# Patient Record
Sex: FEMALE | Race: WHITE
[De-identification: names, ages, dates, MRNs, and addresses within clinical notes are randomized per-mention and may not be internally consistent; named-entity substitution may affect disease eponyms.]

---

## 2022-02-10 PROBLEM — Z00.00 ENCOUNTER FOR PREVENTIVE HEALTH EXAMINATION: Status: ACTIVE | Noted: 2022-02-10

## 2022-02-14 ENCOUNTER — APPOINTMENT (OUTPATIENT)
Dept: UROGYNECOLOGY | Facility: CLINIC | Age: 67
End: 2022-02-14
Payer: MEDICARE

## 2022-02-14 DIAGNOSIS — R35.1 NOCTURIA: ICD-10-CM

## 2022-02-14 DIAGNOSIS — Z81.8 FAMILY HISTORY OF OTHER MENTAL AND BEHAVIORAL DISORDERS: ICD-10-CM

## 2022-02-14 DIAGNOSIS — E11.9 TYPE 2 DIABETES MELLITUS W/OUT COMPLICATIONS: ICD-10-CM

## 2022-02-14 DIAGNOSIS — Z83.3 FAMILY HISTORY OF DIABETES MELLITUS: ICD-10-CM

## 2022-02-14 LAB
BILIRUB UR QL STRIP: NORMAL
CLARITY UR: NORMAL
COLLECTION METHOD: NORMAL
GLUCOSE UR-MCNC: NORMAL
HCG UR QL: 0.2 EU/DL
HGB UR QL STRIP.AUTO: NORMAL
KETONES UR-MCNC: NORMAL
LEUKOCYTE ESTERASE UR QL STRIP: NORMAL
NITRITE UR QL STRIP: NORMAL
PH UR STRIP: 5.5
PROT UR STRIP-MCNC: NORMAL
SP GR UR STRIP: 1.03

## 2022-02-14 PROCEDURE — 81003 URINALYSIS AUTO W/O SCOPE: CPT | Mod: QW

## 2022-02-14 PROCEDURE — 99203 OFFICE O/P NEW LOW 30 MIN: CPT

## 2022-02-14 NOTE — REASON FOR VISIT
[Initial Visit ___] : an initial visit for [unfilled] [Other: _____] : [unfilled] [Questionnaire Received] : Patient questionnaire received [Intake Form Reviewed] : Patient intake form with past medical history, surgical history, family history and social history reviewed today

## 2022-02-15 PROBLEM — E11.9 DIABETES MELLITUS: Status: ACTIVE | Noted: 2022-02-15

## 2022-02-15 PROBLEM — Z81.8 FAMILY HISTORY OF DEPRESSION: Status: ACTIVE | Noted: 2022-02-15

## 2022-02-15 PROBLEM — Z83.3 FAMILY HISTORY OF DIABETES MELLITUS: Status: ACTIVE | Noted: 2022-02-15

## 2022-02-16 NOTE — PHYSICAL EXAM
[Chaperone Present] : A chaperone was present in the examining room during all aspects of the physical examination [No Acute Distress] : in no acute distress [Well developed] : well developed [Well Nourished] : ~L well nourished [Normal Mood/Affect] : mood and affect are normal [Respirations regular] : ~T respiratory rate was regular [No Edema] : ~T edema was not present [Warm and Dry] : was warm and dry to touch [Vulvar Atrophy] : vulvar atrophy [Normal Appearance] : general appearance was normal [Uterine Adnexae] : were not tender and not enlarged [Normal] : normal [Post Void Residual ____ml] : post void residual was [unfilled] ml [Anxiety] : patient is not anxious [Tenderness] : ~T no ~M abdominal tenderness observed [Distended] : not distended

## 2022-02-16 NOTE — HISTORY OF PRESENT ILLNESS
[Vaginal Wall Prolapse] : no [Rectal Prolapse] : no [] : years ago [Urinary Frequency] : no [Feelings Of Urinary Urgency] : no [Urinary Tract Infection] : no [x3+] : nocturia three or more  times a night [de-identified] : sometimes  [FreeTextEntry1] : pt was tx'd twice for UTI in the last month  was initially tx'd with Cipro had a repeat cx which came back negative but was given nitrofurantoin.\par pt reports with one of the uti small spots of blood in it

## 2022-02-16 NOTE — DISCUSSION/SUMMARY
[FreeTextEntry1] : I reviewed the above findings with the patient.  We discussed that on urine dipstick today there was no evidence of hematuria.  We discussed that with a UTI you can get hematuria and I have asked her to get copies of her previous urine culture results and urinalysis.  We discussed returning if she becomes symptomatic for UTI.  We discussed good glucose control and conservative measures to help prevent UTI.  I UG a patient information on UTI was given to her.  I have asked her to follow-up in the office in approximately 3 months or earlier as necessary.

## 2022-03-04 ENCOUNTER — NON-APPOINTMENT (OUTPATIENT)
Age: 67
End: 2022-03-04

## 2022-05-18 ENCOUNTER — APPOINTMENT (OUTPATIENT)
Dept: UROGYNECOLOGY | Facility: CLINIC | Age: 67
End: 2022-05-18

## 2022-06-27 ENCOUNTER — APPOINTMENT (OUTPATIENT)
Dept: UROGYNECOLOGY | Facility: CLINIC | Age: 67
End: 2022-06-27

## 2022-06-27 DIAGNOSIS — R35.1 NOCTURIA: ICD-10-CM

## 2022-06-27 DIAGNOSIS — N39.41 URGE INCONTINENCE: ICD-10-CM

## 2022-06-27 LAB
BILIRUB UR QL STRIP: NORMAL
CLARITY UR: CLEAR
COLLECTION METHOD: NORMAL
GLUCOSE UR-MCNC: 250
HCG UR QL: 0.2 EU/DL
HGB UR QL STRIP.AUTO: NEGATIVE
KETONES UR-MCNC: 15
LEUKOCYTE ESTERASE UR QL STRIP: NEGATIVE
NITRITE UR QL STRIP: NEGATIVE
PH UR STRIP: 5.5
PROT UR STRIP-MCNC: NEGATIVE
SP GR UR STRIP: 1.03

## 2022-06-27 PROCEDURE — 99214 OFFICE O/P EST MOD 30 MIN: CPT

## 2022-06-27 PROCEDURE — 81003 URINALYSIS AUTO W/O SCOPE: CPT | Mod: QW

## 2022-06-28 PROBLEM — N39.41 URGE INCONTINENCE OF URINE: Status: ACTIVE | Noted: 2022-02-14

## 2022-06-28 NOTE — PHYSICAL EXAM
[No Acute Distress] : in no acute distress [Well developed] : well developed [Well Nourished] : ~L well nourished [Good Hygeine] : demonstrates good hygeine [Oriented x3] : oriented to person, place, and time [Normal Memory] : ~T memory was ~L unimpaired [Normal Mood/Affect] : mood and affect are normal [None] : no CVA tenderness [Warm and Dry] : was warm and dry to touch [Normal Gait] : gait was normal [Vulvar Atrophy] : vulvar atrophy [Labia Majora] : were normal [Normal] : was normal [Normal Appearance] : general appearance was normal [Atrophy] : atrophy [Dry Mucosa] : dry mucosa [Anxiety] : patient is not anxious [Tenderness] : ~T no ~M abdominal tenderness observed

## 2022-06-28 NOTE — HISTORY OF PRESENT ILLNESS
[FreeTextEntry1] : [place] is a 67 y/o with a hx of  DM Type 2, nocturia and UI who presents to the office for follow up. Her daughter reports that her mother was recently treated by her PCP for a UTI and he wanted her to follow up in our office for UCA. She has no acute complaints today. She was last seen in our office 2/2022 and the Udip was negative at that time.

## 2022-06-28 NOTE — PROCEDURE
[Straight Catheterization] : insertion of a straight catheter [Urinary Tract Infection] : a urinary tract infection [Patient] : the patient [Other: ___] : [unfilled] [Allergies Reviewed] : Allergies reviewed [___ Fr Straight Tip] : a [unfilled] in [place] straight tip catheter [None] : there were no complications with the catheter insertion [Clear] : clear [Culture] : culture [Urinalysis] : urinalysis [No Complications] : no complications [Tolerated Well] : the patient tolerated the procedure well [0] : 0

## 2022-06-28 NOTE — DISCUSSION/SUMMARY
[FreeTextEntry1] : Urge incontinence/Nocturia:\par \par -Udip in the office showed Glucose 250 mg/dL, small bilirubin, ketones 15 mg/dL\par -Discussed the importance of good glucose control, advised pt to follow up with endocrinologist\par -Reviewed the importance of PO hydration, advised her to drink 1-1.5 liters of water per day\par -Advised pt to stop drinking 2 hours before bed, avoiding caffeine, seltzers, any carbonated drinks especially before bed.\par -Reviewed kegel exercises\par \par Will follow up 9/2022\par Advised pt and daughter to call office with any questions or concerns, verbalized understanding

## 2022-09-07 ENCOUNTER — APPOINTMENT (OUTPATIENT)
Dept: UROGYNECOLOGY | Facility: CLINIC | Age: 67
End: 2022-09-07